# Patient Record
Sex: FEMALE | NOT HISPANIC OR LATINO | Employment: FULL TIME | URBAN - METROPOLITAN AREA
[De-identification: names, ages, dates, MRNs, and addresses within clinical notes are randomized per-mention and may not be internally consistent; named-entity substitution may affect disease eponyms.]

---

## 2023-09-08 PROBLEM — N93.9 ABNORMAL UTERINE BLEEDING (AUB): Status: ACTIVE | Noted: 2023-09-08

## 2023-09-08 PROBLEM — N91.2 AMENORRHEA: Status: ACTIVE | Noted: 2023-09-08

## 2023-09-08 PROBLEM — N92.6 IRREGULAR PERIODS: Status: ACTIVE | Noted: 2023-09-08

## 2023-11-06 RX ORDER — IBUPROFEN 600 MG/1
TABLET ORAL
COMMUNITY
Start: 2023-05-26

## 2023-11-06 RX ORDER — ALBUTEROL SULFATE 90 UG/1
AEROSOL, METERED RESPIRATORY (INHALATION)
COMMUNITY
Start: 2023-06-02

## 2023-12-04 ENCOUNTER — OFFICE VISIT (OUTPATIENT)
Dept: PRIMARY CARE | Facility: CLINIC | Age: 28
End: 2023-12-04
Payer: COMMERCIAL

## 2023-12-04 VITALS
TEMPERATURE: 97.2 F | DIASTOLIC BLOOD PRESSURE: 82 MMHG | HEART RATE: 74 BPM | OXYGEN SATURATION: 98 % | SYSTOLIC BLOOD PRESSURE: 122 MMHG | HEIGHT: 67 IN | WEIGHT: 202 LBS | BODY MASS INDEX: 31.71 KG/M2

## 2023-12-04 DIAGNOSIS — N30.01 ACUTE CYSTITIS WITH HEMATURIA: ICD-10-CM

## 2023-12-04 DIAGNOSIS — R31.9 HEMATURIA, UNSPECIFIED TYPE: ICD-10-CM

## 2023-12-04 DIAGNOSIS — R39.9 UTI SYMPTOMS: Primary | ICD-10-CM

## 2023-12-04 DIAGNOSIS — Z72.51 HISTORY OF UNPROTECTED SEX: ICD-10-CM

## 2023-12-04 DIAGNOSIS — Z28.21 INFLUENZA VACCINATION DECLINED: ICD-10-CM

## 2023-12-04 LAB
POC APPEARANCE, URINE: CLEAR
POC BILIRUBIN, URINE: NEGATIVE
POC BLOOD, URINE: ABNORMAL
POC COLOR, URINE: YELLOW
POC GLUCOSE, URINE: NEGATIVE MG/DL
POC KETONES, URINE: NEGATIVE MG/DL
POC LEUKOCYTES, URINE: ABNORMAL
POC NITRITE,URINE: NEGATIVE
POC PH, URINE: 5 PH
POC PROTEIN, URINE: ABNORMAL MG/DL
POC SPECIFIC GRAVITY, URINE: 1.02
POC UROBILINOGEN, URINE: 1 EU/DL
PREGNANCY TEST URINE, POC: NEGATIVE

## 2023-12-04 PROCEDURE — 87086 URINE CULTURE/COLONY COUNT: CPT | Performed by: STUDENT IN AN ORGANIZED HEALTH CARE EDUCATION/TRAINING PROGRAM

## 2023-12-04 PROCEDURE — 1036F TOBACCO NON-USER: CPT | Performed by: STUDENT IN AN ORGANIZED HEALTH CARE EDUCATION/TRAINING PROGRAM

## 2023-12-04 PROCEDURE — 81002 URINALYSIS NONAUTO W/O SCOPE: CPT | Performed by: STUDENT IN AN ORGANIZED HEALTH CARE EDUCATION/TRAINING PROGRAM

## 2023-12-04 PROCEDURE — 99214 OFFICE O/P EST MOD 30 MIN: CPT | Performed by: STUDENT IN AN ORGANIZED HEALTH CARE EDUCATION/TRAINING PROGRAM

## 2023-12-04 RX ORDER — SULFAMETHOXAZOLE AND TRIMETHOPRIM 800; 160 MG/1; MG/1
1 TABLET ORAL 2 TIMES DAILY
Qty: 6 TABLET | Refills: 0 | Status: SHIPPED | OUTPATIENT
Start: 2023-12-04 | End: 2023-12-07

## 2023-12-04 ASSESSMENT — PATIENT HEALTH QUESTIONNAIRE - PHQ9
2. FEELING DOWN, DEPRESSED OR HOPELESS: NOT AT ALL
SUM OF ALL RESPONSES TO PHQ9 QUESTIONS 1 AND 2: 0
1. LITTLE INTEREST OR PLEASURE IN DOING THINGS: NOT AT ALL

## 2023-12-04 ASSESSMENT — PAIN SCALES - GENERAL: PAINLEVEL: 3

## 2023-12-04 ASSESSMENT — LIFESTYLE VARIABLES: HOW OFTEN DO YOU HAVE A DRINK CONTAINING ALCOHOL: MONTHLY OR LESS

## 2023-12-04 NOTE — PROGRESS NOTES
Subjective   Maria Isabel Kellogg is a 28 y.o. female who presents for No chief complaint on file..    HPI:      UTI sx frequency for one week.  Frequency.  Low back pain, no CVAT. No fevers/chills.  Feels like able to empty her bladder.      Just started period this morning.  Sexually active.  Not on birth control, agreeable to     ROS:   Review of systems is essentially negative for all systems except for any identified issues in HPI above.    Objective     There were no vitals taken for this visit.     PHYSICAL EXAM    GENERAL  Well-appearing, pleasant and cooperative.  No acute distress.    HEENT  HEAD:   Normocephalic.  Atraumatic.  EYES:  PERRLA.  No scleral icterus or conjunctival injection.  NECK:  No adenopathy.  No palpable thyroid enlargement or nodules.    THROAT:  Moist oropharynx without tonsillar enlargement or exudates.    LUNGS:    Clear to auscultation bilaterally.  No wheezes, rales, rhonchi.    CARDIAC:  Regular rate and rhythm.  Normal S1S2.  No murmurs/rubs/gallops.    ABDOMEN:  Soft, non-tender, non-distended.  No hepatosplenomegaly.  Normoactive bowel sounds.  No CVAT bilaterally    MUSCULOSKELETAL:  No gross abnormalities.   No joint swelling or erythema,.  No spinal or paraspinal tenderness to palpation.    EXTREMITIES:  No LE edema or cyanosis.      NEURO           Alert and oriented x3. No focal deficits.    PSYCH:          Affect appropriate.           Assessment/Plan   Problem List Items Addressed This Visit    None  Visit Diagnoses       UTI symptoms    -  Primary    Relevant Orders    POCT UA (nonautomated) manually resulted (Completed)    Urine Culture    Hematuria, unspecified type        Relevant Orders    Urine Culture    History of unprotected sex        Relevant Orders    POCT Pregnancy, Urine manually resulted (Completed)    Acute cystitis with hematuria        Relevant Medications    sulfamethoxazole-trimethoprim (Bactrim DS) 800-160 mg tablet    Influenza vaccination declined                      Shannon Iyer MD

## 2023-12-04 NOTE — PATIENT INSTRUCTIONS
Thank you for coming to see me today.    Antibiotics sent to pharmacy.    We will call you with urine culture results.  Follow-up if symptoms worsening or not improving with treatment course.

## 2023-12-05 LAB — BACTERIA UR CULT: NORMAL

## 2024-01-25 ENCOUNTER — APPOINTMENT (OUTPATIENT)
Dept: OTOLARYNGOLOGY | Facility: CLINIC | Age: 29
End: 2024-01-25
Payer: COMMERCIAL

## 2024-03-30 ENCOUNTER — HOSPITAL ENCOUNTER (OUTPATIENT)
Dept: RADIOLOGY | Facility: EXTERNAL LOCATION | Age: 29
Discharge: HOME | End: 2024-03-30

## 2024-03-30 DIAGNOSIS — M79.672 FOOT PAIN, LEFT: ICD-10-CM

## 2025-01-16 ENCOUNTER — APPOINTMENT (OUTPATIENT)
Dept: URGENT CARE | Age: 30
End: 2025-01-16
Payer: COMMERCIAL

## 2025-01-28 ENCOUNTER — OFFICE VISIT (OUTPATIENT)
Dept: URGENT CARE | Age: 30
End: 2025-01-28
Payer: COMMERCIAL

## 2025-01-28 VITALS
WEIGHT: 198 LBS | HEIGHT: 66 IN | SYSTOLIC BLOOD PRESSURE: 131 MMHG | DIASTOLIC BLOOD PRESSURE: 77 MMHG | TEMPERATURE: 98.7 F | RESPIRATION RATE: 18 BRPM | BODY MASS INDEX: 31.82 KG/M2 | HEART RATE: 108 BPM | OXYGEN SATURATION: 99 %

## 2025-01-28 DIAGNOSIS — R68.89 FLU-LIKE SYMPTOMS: ICD-10-CM

## 2025-01-28 DIAGNOSIS — J06.9 VIRAL URI: Primary | ICD-10-CM

## 2025-01-28 DIAGNOSIS — R05.9 COUGH, UNSPECIFIED TYPE: ICD-10-CM

## 2025-01-28 DIAGNOSIS — H61.23 IMPACTED CERUMEN OF BOTH EARS: ICD-10-CM

## 2025-01-28 LAB
POC RAPID INFLUENZA A: NEGATIVE
POC RAPID INFLUENZA B: NEGATIVE
POC SARS-COV-2 AG BINAX: NORMAL

## 2025-01-28 PROCEDURE — 87811 SARS-COV-2 COVID19 W/OPTIC: CPT | Performed by: NURSE PRACTITIONER

## 2025-01-28 PROCEDURE — 3008F BODY MASS INDEX DOCD: CPT | Performed by: NURSE PRACTITIONER

## 2025-01-28 PROCEDURE — 99213 OFFICE O/P EST LOW 20 MIN: CPT | Performed by: NURSE PRACTITIONER

## 2025-01-28 PROCEDURE — 87804 INFLUENZA ASSAY W/OPTIC: CPT | Performed by: NURSE PRACTITIONER

## 2025-01-28 RX ORDER — GUAIFENESIN 1200 MG/1
1200 TABLET, EXTENDED RELEASE ORAL 2 TIMES DAILY
Qty: 14 TABLET | Refills: 0 | Status: SHIPPED | OUTPATIENT
Start: 2025-01-28 | End: 2025-02-04

## 2025-01-28 RX ORDER — BENZONATATE 200 MG/1
200 CAPSULE ORAL 3 TIMES DAILY PRN
Qty: 30 CAPSULE | Refills: 0 | Status: SHIPPED | OUTPATIENT
Start: 2025-01-28 | End: 2025-02-07

## 2025-01-28 RX ORDER — LEVOCETIRIZINE DIHYDROCHLORIDE 5 MG/1
5 TABLET, FILM COATED ORAL EVERY EVENING
Qty: 30 TABLET | Refills: 0 | Status: SHIPPED | OUTPATIENT
Start: 2025-01-28 | End: 2025-02-27

## 2025-01-28 RX ORDER — ASPIRIN 81 MG
5 TABLET, DELAYED RELEASE (ENTERIC COATED) ORAL 2 TIMES DAILY
Qty: 15 ML | Refills: 0 | Status: SHIPPED | OUTPATIENT
Start: 2025-01-28 | End: 2025-02-01

## 2025-01-28 ASSESSMENT — ENCOUNTER SYMPTOMS
SHORTNESS OF BREATH: 0
CONSTIPATION: 0
DIARRHEA: 0
DIFFICULTY URINATING: 0
FEVER: 1
SORE THROAT: 0
RHINORRHEA: 1
CHILLS: 1
ARTHRALGIAS: 0
HEADACHES: 0
SINUS PAIN: 0
NECK PAIN: 0
SINUS PRESSURE: 0
DIZZINESS: 0
COUGH: 1
CHEST TIGHTNESS: 0
TROUBLE SWALLOWING: 0
BACK PAIN: 0
APPETITE CHANGE: 0
FATIGUE: 1
MYALGIAS: 0
NAUSEA: 0
WOUND: 0
ABDOMINAL PAIN: 0
EYE PAIN: 0
VOMITING: 0
PALPITATIONS: 0

## 2025-01-28 ASSESSMENT — VISUAL ACUITY: OU: 1

## 2025-01-28 NOTE — LETTER
January 28, 2025     Patient: Maria Isabel Kellogg   YOB: 1995   Date of Visit: 1/28/2025       To Whom It May Concern:    Maria Isabel Kellogg was seen in my clinic on 1/28/2025 at 8:30 am. Please excuse Maria Isabel for her absence from work on this day to make the appointment. She may return to work on 1/30/2025.    If you have any questions or concerns, please don't hesitate to call.         Sincerely,         BELLA Monk-CNP        CC: No Recipients

## 2025-01-28 NOTE — PROGRESS NOTES
Subjective   Patient ID: Maria Isabel Kellogg is a 29 y.o. female. They present today with a chief complaint of Cough.    History of Present Illness  Patient is a 30yo female who presents with cough and congestion x 3 days. Patient states that multiple people I her office have COVID.       Cough  Associated symptoms include chills, a fever, postnasal drip and rhinorrhea. Pertinent negatives include no chest pain, ear pain, headaches, myalgias, rash, sore throat or shortness of breath.       Past Medical History  Allergies as of 01/28/2025 - Reviewed 01/28/2025   Allergen Reaction Noted    Bee pollen Other 09/08/2023    Shrimp Other 09/08/2023       (Not in a hospital admission)       Past Medical History:   Diagnosis Date    Encounter for screening for malignant neoplasm of cervix 03/09/2016    Cervical cancer screening    Urinary tract infection, site not specified     UTI (lower urinary tract infection)       Past Surgical History:   Procedure Laterality Date    HERNIA REPAIR  01/22/2018    Inguinal Hernia Repair        reports that she has never smoked. She has never used smokeless tobacco. She reports current alcohol use.    Review of Systems  Review of Systems   Constitutional:  Positive for chills, fatigue and fever. Negative for appetite change.   HENT:  Positive for congestion, postnasal drip and rhinorrhea. Negative for dental problem, ear pain, hearing loss, sinus pressure, sinus pain, sneezing, sore throat and trouble swallowing.    Eyes:  Negative for pain.   Respiratory:  Positive for cough. Negative for chest tightness and shortness of breath.    Cardiovascular:  Negative for chest pain and palpitations.   Gastrointestinal:  Negative for abdominal pain, constipation, diarrhea, nausea and vomiting.   Genitourinary:  Negative for difficulty urinating.   Musculoskeletal:  Negative for arthralgias, back pain, gait problem, myalgias and neck pain.   Skin:  Negative for rash and wound.   Neurological:  Negative  "for dizziness and headaches.   Psychiatric/Behavioral:  Negative for self-injury and suicidal ideas.                                   Objective    Vitals:    01/28/25 0827   BP: 131/77   Pulse: 108   Resp: 18   Temp: 37.1 °C (98.7 °F)   TempSrc: Temporal   SpO2: 99%   Weight: 89.8 kg (198 lb)   Height: 1.676 m (5' 6\")     No LMP recorded.    Physical Exam  Vitals reviewed.   Constitutional:       General: She is awake. She is not in acute distress.     Appearance: Normal appearance. She is well-developed and well-groomed. She is not ill-appearing.   HENT:      Head: Normocephalic and atraumatic.      Right Ear: Hearing, tympanic membrane, ear canal and external ear normal. There is impacted cerumen.      Left Ear: Hearing, tympanic membrane, ear canal and external ear normal. There is impacted cerumen.      Nose: Congestion present. No nasal deformity, signs of injury or rhinorrhea.      Mouth/Throat:      Lips: Pink.      Mouth: Mucous membranes are moist. No injury.      Dentition: Normal dentition.      Pharynx: Oropharynx is clear. Uvula midline. Postnasal drip present. No pharyngeal swelling, oropharyngeal exudate, posterior oropharyngeal erythema or uvula swelling.      Tonsils: No tonsillar exudate. 1+ on the right. 1+ on the left.   Eyes:      General: Lids are normal. Vision grossly intact. Gaze aligned appropriately.   Cardiovascular:      Rate and Rhythm: Normal rate and regular rhythm.      Heart sounds: Normal heart sounds, S1 normal and S2 normal. Heart sounds not distant. No murmur heard.     No friction rub. No gallop.   Pulmonary:      Effort: Pulmonary effort is normal. No tachypnea, bradypnea, accessory muscle usage, prolonged expiration, respiratory distress or retractions.      Breath sounds: Normal breath sounds and air entry. No stridor, decreased air movement or transmitted upper airway sounds. No decreased breath sounds, wheezing, rhonchi or rales.   Lymphadenopathy:      Head:      Right " side of head: No submental, submandibular, tonsillar, preauricular, posterior auricular or occipital adenopathy.      Left side of head: No submental, submandibular, tonsillar, preauricular, posterior auricular or occipital adenopathy.      Cervical:      Right cervical: No superficial cervical adenopathy.     Left cervical: No superficial cervical adenopathy.   Skin:     General: Skin is warm and dry.      Capillary Refill: Capillary refill takes less than 2 seconds.   Neurological:      General: No focal deficit present.      Mental Status: She is alert.      Gait: Gait is intact.   Psychiatric:         Attention and Perception: Attention and perception normal.         Mood and Affect: Mood and affect normal.         Speech: Speech normal.         Behavior: Behavior normal. Behavior is cooperative.         Procedures    Point of Care Test & Imaging Results from this visit  Results for orders placed or performed in visit on 01/28/25   POCT BinaxNOW Covid-19 Ag Card manually resulted   Result Value Ref Range    POC ABDIRAHMAN-COV-2 AG  Presumptive negative test for SARS-CoV-2 (no antigen detected)     Presumptive negative test for SARS-CoV-2 (no antigen detected)   POCT Influenza A/B manually resulted   Result Value Ref Range    POC Rapid Influenza A Negative Negative    POC Rapid Influenza B Negative Negative      No results found.    Diagnostic study results (if any) were reviewed by LOREN Monk.    Assessment/Plan   Allergies, medications, history, and pertinent labs/EKGs/Imaging reviewed by LOREN Monk.     Medical Decision Making  COVID and Flu were negative. Physical exam and duration of symptoms consistent with a viral illness. Advise symptomatic care including warm salt water gargles, humidified air, rest, fluids, evening antihistamine in addtion to the medication regimen detailed below.     Risks, benefits, and alternatives of the medications and treatment plan prescribed  today were discussed, and patient expressed understanding. Plan follow up as discussed or as needed if any worsening symptoms or change in condition. Reinforced red flags including (but not limited to): severe or worsening abdominal pain; difficulty swallowing; stiff neck; shortness of breath; coughing or vomiting blood; chest pain; and new or increased fever are indications to go to the Emergency Department.    The patient voices understanding of all medications. No barriers to adherence. Patient is taking all medications as prescribed and tolerating well. For any new medications, the patient was instructed of directions for and consequences of not taking medication and they were informed about the potential side effects and drug interactions. The after-visit summary was given to the patient and care instructions were reviewed with the patient. All questions were answered and the patient verbalized understanding of the plan of care for today.      Orders and Diagnoses  Diagnoses and all orders for this visit:  Viral URI  -     levocetirizine (Xyzal) 5 mg tablet; Take 1 tablet (5 mg) by mouth once daily in the evening.  Cough, unspecified type  -     POCT BinaxNOW Covid-19 Ag Card manually resulted  -     benzonatate (Tessalon) 200 mg capsule; Take 1 capsule (200 mg) by mouth 3 times a day as needed for cough for up to 10 days. Do not crush or chew.  -     guaiFENesin (Mucinex) 1,200 mg tablet extended release 12hr; Take 1 tablet (1,200 mg) by mouth 2 times a day for 7 days.  Flu-like symptoms  -     POCT Influenza A/B manually resulted  Impacted cerumen of both ears  -     carbamide peroxide (Debrox) 6.5 % otic solution; Administer 5 drops into each ear 2 times a day for 4 days. Then flush with warm water.      Medical Admin Record      Patient disposition: Home    Electronically signed by LOREN Monk  8:53 AM

## 2025-03-04 ENCOUNTER — APPOINTMENT (OUTPATIENT)
Dept: PRIMARY CARE | Facility: CLINIC | Age: 30
End: 2025-03-04
Payer: COMMERCIAL